# Patient Record
(demographics unavailable — no encounter records)

---

## 2024-12-11 NOTE — ASSESSMENT
[FreeTextEntry1] : Assessment/Plan: Patient is a 60yo female presents for annual physical.  She is up to date with her gynecologist, and mammography.  Her BMI is 20. She maintains a healthy diet and tries to exercise regularly.   Patient is fasting for bloodwork today.  She has history of hypothyroidism: will add TSH to bloodwork today and cont levothyroxine   EKG reviewed. NSR  Brief counseling - Age appropriate preventative care counseling/HCM discussed including, but not limited to proper nutrition, regular exercise, routine dental and eye care, sunscreen/skin cancer prevention, seatbelt use and routine gynecological care.  Labs to be done:  UA, CBC, Lipids, BMP/blood glucose, TSH, Vitamin D, HbA1C All questions were answered. Patient understands and is in agreement with the plan of care. Patient to call to follow up lab results. Return to clinic as needed or call with questions. Annual Well Visit: Recommended 1 year.

## 2024-12-11 NOTE — HISTORY OF PRESENT ILLNESS
[FreeTextEntry1] : I am here for a checkup [de-identified] : History of Present Illness:  This patient is a 62 yo female here for her health maintenance evaluation.  Ms. 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  She has no specific complaints  For recent symptom sof cough and SOB she was assessed by cards and pulm with normal evaluation, pulm assessed it as possible GERD or allergy.  She was also assessed by ENT and dx with GERD. Pt taking PPI  She f/u with  cardiology Dr. Reynoso pulmonary Dr. Hyde endo Dr. Salter dermatology   Reproductive Health: Postmenopausal Significant PMH: hypothyroid, squamous cell skin cancer (nose) Surgical Hx:  Family Hx: mother- Hep C; father- COPD Allergies: NKDA Meds: levo 75 mcg, pantoprazole    Brief Social History: Work - dental office Living situation:  with 1 son (26) Tobacco Use: Never smoked. EtOH/Drug use: social with diner/wekends   Immunization: Pneumovax Tdap (rec every 10 years): Influenza (rec yearly): Shingrex (age 60): done   Screening: Pap and Pelvic GYN UTD: Mammogram: done  at [facility] CARLOS [place] Colon CA Screening: Dr. Gaxiola colonoscopy done , repaet due Osteoperosis: DEXA: done with GYN Vision/Dental: up-to-date with vision and dental